# Patient Record
Sex: MALE | Race: WHITE | NOT HISPANIC OR LATINO | URBAN - METROPOLITAN AREA
[De-identification: names, ages, dates, MRNs, and addresses within clinical notes are randomized per-mention and may not be internally consistent; named-entity substitution may affect disease eponyms.]

---

## 2022-04-11 ENCOUNTER — HISTORICAL (OUTPATIENT)
Dept: ADMINISTRATIVE | Facility: HOSPITAL | Age: 32
End: 2022-04-11

## 2022-04-25 VITALS
WEIGHT: 120 LBS | HEIGHT: 66 IN | BODY MASS INDEX: 19.29 KG/M2 | SYSTOLIC BLOOD PRESSURE: 116 MMHG | DIASTOLIC BLOOD PRESSURE: 64 MMHG

## 2023-04-14 ENCOUNTER — TELEPHONE (OUTPATIENT)
Dept: NEUROLOGY | Facility: CLINIC | Age: 33
End: 2023-04-14

## 2023-04-14 DIAGNOSIS — G40.909 SEIZURE DISORDER: Primary | ICD-10-CM

## 2023-04-14 RX ORDER — LAMOTRIGINE 100 MG/1
200 TABLET ORAL 2 TIMES DAILY
Qty: 120 TABLET | Refills: 0 | Status: SHIPPED | OUTPATIENT
Start: 2023-04-14 | End: 2023-05-18 | Stop reason: SDUPTHER

## 2023-04-14 NOTE — TELEPHONE ENCOUNTER
Mother called to req refill of LTG for patient; d/w her that I would send in one month for pt, but he needs to have f/u appt: LOV 9/2021, and 10/2022 appt was cancelled.  ..  She says that she will speak with him to have him call for appt, and also states that telemed appt is best for him: advised her that he will need to activate both patient portal and the [facility] in  order to participate in a virtual visit; she verbalizes understanding and will discuss w him to ensure that this is done, and will have him call to sched appt as soon as possible; she verified his cell number and requests to have portal sign up text sent to his cell; done  ..  Refill LTG for 30d supply sent to pharm of choice

## 2023-04-19 DIAGNOSIS — G40.909 SEIZURE DISORDER: ICD-10-CM

## 2023-04-19 RX ORDER — LAMOTRIGINE 100 MG/1
200 TABLET ORAL 2 TIMES DAILY
Qty: 120 TABLET | Refills: 0 | OUTPATIENT
Start: 2023-04-19 | End: 2023-05-19

## 2023-05-18 ENCOUNTER — OFFICE VISIT (OUTPATIENT)
Dept: NEUROLOGY | Facility: CLINIC | Age: 33
End: 2023-05-18

## 2023-05-18 DIAGNOSIS — G40.909 SEIZURE DISORDER: ICD-10-CM

## 2023-05-18 DIAGNOSIS — G40.009 PARTIAL IDIOPATHIC EPILEPSY WITH SEIZURES OF LOCALIZED ONSET, NOT INTRACTABLE, WITHOUT STATUS EPILEPTICUS: Primary | ICD-10-CM

## 2023-05-18 PROCEDURE — 99213 PR OFFICE/OUTPT VISIT, EST, LEVL III, 20-29 MIN: ICD-10-PCS | Mod: 95,,, | Performed by: NURSE PRACTITIONER

## 2023-05-18 PROCEDURE — 99213 OFFICE O/P EST LOW 20 MIN: CPT | Mod: 95,,, | Performed by: NURSE PRACTITIONER

## 2023-05-18 RX ORDER — LAMOTRIGINE 100 MG/1
200 TABLET ORAL 2 TIMES DAILY
Qty: 360 TABLET | Refills: 3 | Status: SHIPPED | OUTPATIENT
Start: 2023-05-18

## 2023-05-18 NOTE — PROGRESS NOTES
Virtual Visit Note    Subjective:          Patient ID: Giovanny Cuellar is a 33 y.o. male.    Chief Complaint: annual follow up for epilepsy     HPI:           The patient location is: work  Visit type: audiovisual    Overall, stable  Remains sz free for many years     mg tab, 2 tabs BID; tolerating well      This is a telemedicine note. After obtaining verbal consent/patient identification using name and , patient was treated using real time audio/video, according to [facility] protocols. Quan JOHNSON NP [distant provider], conducted the visit from location identified below. The patient participated in the visit at a non-[facility] location selected by the patient (or patients representative), identified below. I am licensed in the state where the patient stated they are located. The patient (or patients representative) stated that they understood and accepted the privacy and security risks to their information at their location.    Distant provider was located at [facility]    Each patient to whom he or she provides medical services by telemedicine is:  (1) informed of the relationship between the physician and patient and the respective role of any other health care provider with respect to management of the patient; and (2) notified that he or she may decline to receive medical services by telemedicine and may withdraw from such care at any time.    Review of Systems: see HPI          Past Medical History:   Diagnosis Date    Seizures        History reviewed. No pertinent surgical history.    History reviewed. No pertinent family history.    Social History     Socioeconomic History    Marital status: Single   Tobacco Use    Smoking status: Every Day     Types: Cigarettes, Vaping with nicotine    Smokeless tobacco: Never   Substance and Sexual Activity    Drug use: Never       Review of patient's allergies indicates:  No Known Allergies    Current Outpatient Medications    Medication Instructions    lamoTRIgine (LAMICTAL) 200 mg, Oral, 2 times daily         Objective:      Physical Exam:  General- Patient alert and oriented x3 in NAD  Speech - nml  HEENT- EOMI  Resp- No increased WOB noted. Not using accessory muscles.  Skin-  No Jaundice. No visible skin lesions.        Assessment/Plan:     Problem List Items Addressed This Visit          Neuro    Partial idiopathic epilepsy with seizures of localized onset, not intractable, without status epilepticus - Primary    Continue present management  Refills generated for LTG    Risks of recurrent seizure discussed. seizure precautions discussed. Pt aware that unlawful to drive in [place] until seizure free at least 6 months.    Do not swim in pool alone  Do not bathe in tub full of water; shower preferred  Avoid medications such as Tramadol, Wellbutrin, Cipro, Levaquin  Avoid ladders/heights  Avoid binge drinking  Avoid sleep deprivation     Follow up in 1 year, sooner if needed; telemedicine           Quan Long, MSN, APRN, AGACNP-BC

## 2024-05-16 ENCOUNTER — PATIENT MESSAGE (OUTPATIENT)
Dept: NEUROLOGY | Facility: CLINIC | Age: 34
End: 2024-05-16

## 2024-05-16 ENCOUNTER — OFFICE VISIT (OUTPATIENT)
Dept: NEUROLOGY | Facility: CLINIC | Age: 34
End: 2024-05-16

## 2024-05-16 DIAGNOSIS — G40.909 SEIZURE DISORDER: ICD-10-CM

## 2024-05-16 DIAGNOSIS — G40.009 PARTIAL IDIOPATHIC EPILEPSY WITH SEIZURES OF LOCALIZED ONSET, NOT INTRACTABLE, WITHOUT STATUS EPILEPTICUS: Primary | ICD-10-CM

## 2024-05-16 PROCEDURE — 99213 OFFICE O/P EST LOW 20 MIN: CPT | Mod: 95,,, | Performed by: NURSE PRACTITIONER

## 2024-05-16 RX ORDER — LAMOTRIGINE 100 MG/1
200 TABLET ORAL 2 TIMES DAILY
Qty: 360 TABLET | Refills: 3 | Status: SHIPPED | OUTPATIENT
Start: 2024-05-16

## 2024-05-16 NOTE — PROGRESS NOTES
Virtual Visit Note    Subjective:          Patient ID: Giovanny Cuellar is a 34 y.o. male.    Chief Complaint: annual follow up epilepsy     HPI:           The patient location is: work   Visit type: audiovisual    Overall, stable  Remains sz free for many years      mg tab, 2 tabs BID; tolerating well      This is a telemedicine note. After obtaining verbal consent/patient identification using name and , patient was treated using real time audio/video, according to [facility] protocols. IQuan NP [distant provider], conducted the visit from location identified below. The patient participated in the visit at a non-[facility] location selected by the patient (or patients representative), identified below. I am licensed in the state where the patient stated they are located. The patient (or patients representative) stated that they understood and accepted the privacy and security risks to their information at their location.    Distant provider was located at [facility]    Each patient to whom he or she provides medical services by telemedicine is:  (1) informed of the relationship between the physician and patient and the respective role of any other health care provider with respect to management of the patient; and (2) notified that he or she may decline to receive medical services by telemedicine and may withdraw from such care at any time.          Past Medical History:   Diagnosis Date    Seizures        History reviewed. No pertinent surgical history.    No family history on file.    Social History     Socioeconomic History    Marital status: Single   Tobacco Use    Smoking status: Every Day     Types: Cigarettes, Vaping with nicotine    Smokeless tobacco: Never   Substance and Sexual Activity    Drug use: Never       Review of patient's allergies indicates:  No Known Allergies    Current Outpatient Medications   Medication Instructions    lamoTRIgine (LAMICTAL) 200 mg,  Oral, 2 times daily         Objective:      Physical Exam:  General- Patient alert and oriented x3 in NAD  Speech - nml  HEENT- EOMI  Resp- No increased WOB noted. Not using accessory muscles.  Skin-  No Jaundice. No visible skin lesions.        Assessment/Plan:     Problem List Items Addressed This Visit          Neuro    Partial idiopathic epilepsy with seizures of localized onset, not intractable, without status epilepticus - Primary    Continue  mg tab, 2 tabs every morning and at bed time     Risks of recurrent seizure discussed. seizure precautions discussed. Pt aware that unlawful to drive in [place] until seizure free at least 6 months.    Do not swim in pool alone  Do not bathe in tub full of water; shower preferred  Avoid medications such as Tramadol, Wellbutrin, Cipro, Levaquin  Avoid ladders/heights  Avoid binge drinking  Avoid sleep deprivation     PCP -  Earlene Solares NP     Follow up in 1 year - connor Long, MSN, APRN, AGACNP-BC

## 2024-05-16 NOTE — Clinical Note
Please send my OV note to PCP Earlene Solares NP - and make sure somewhere on the communication it states patient is seeking referral to provider for ADD/ADHD - one recommnedation [because he asked me] is April Breaux - please send this info to PCP

## 2025-06-09 DIAGNOSIS — G40.909 SEIZURE DISORDER: Primary | ICD-10-CM

## 2025-06-09 RX ORDER — LAMOTRIGINE 100 MG/1
TABLET ORAL
Qty: 60 TABLET | Refills: 0 | Status: SHIPPED | OUTPATIENT
Start: 2025-06-09

## 2025-07-08 DIAGNOSIS — G40.909 SEIZURE DISORDER: Primary | ICD-10-CM

## 2025-07-08 RX ORDER — LAMOTRIGINE 100 MG/1
TABLET ORAL
Qty: 60 TABLET | Refills: 1 | Status: SHIPPED | OUTPATIENT
Start: 2025-07-08

## 2025-08-29 DIAGNOSIS — G40.909 SEIZURE DISORDER: ICD-10-CM

## 2025-08-29 RX ORDER — LAMOTRIGINE 100 MG/1
200 TABLET ORAL 2 TIMES DAILY
Qty: 120 TABLET | Refills: 0 | Status: SHIPPED | OUTPATIENT
Start: 2025-08-29 | End: 2025-09-28

## 2025-09-02 ENCOUNTER — DOCUMENTATION ONLY (OUTPATIENT)
Dept: NEUROLOGY | Facility: CLINIC | Age: 35
End: 2025-09-02